# Patient Record
Sex: FEMALE | Race: WHITE | NOT HISPANIC OR LATINO | Employment: FULL TIME | URBAN - NONMETROPOLITAN AREA
[De-identification: names, ages, dates, MRNs, and addresses within clinical notes are randomized per-mention and may not be internally consistent; named-entity substitution may affect disease eponyms.]

---

## 2017-06-01 ENCOUNTER — TRANSCRIBE ORDERS (OUTPATIENT)
Dept: NUTRITION | Facility: HOSPITAL | Age: 42
End: 2017-06-01

## 2017-07-10 ENCOUNTER — HOSPITAL ENCOUNTER (OUTPATIENT)
Dept: NUTRITION | Facility: HOSPITAL | Age: 42
Discharge: HOME | End: 2017-07-10
Admitting: FAMILY MEDICINE

## 2017-07-10 VITALS — WEIGHT: 293 LBS | BODY MASS INDEX: 45.99 KG/M2 | HEIGHT: 67 IN

## 2017-07-10 PROCEDURE — 97802 MEDICAL NUTRITION INDIV IN: CPT

## 2017-07-10 NOTE — CONSULTS
Adult Outpatient Nutrition  Assessment/PES    Patient Name:  Tamara Stout  YOB: 1975  MRN: [id number]    Assessment Date:  7/10/2017          General Info       07/10/17 1606    Today's Session    Person(s) attending today's session Patient     Services Used Today? No    General Information    How well do you speak English? very well    Do you speak a language other than English at home? no    Are you able to read and write English? Yes    Lives With child(frandy), dependent   one daughter    Last grade of school completed Some college    Is patient pregnant? no            Physical Findings       07/10/17 1607    Physical Findings/Assessment    Additional Documentation Physical Appearance (Group)    Physical Appearance    Overall Physical Appearance obese              Nutritional Info/Activity       07/10/17 1608    Nutritional Information    Have you had weight changes? Yes    Describe weight changes Fluctuating    What is your desired body weight? 90.7 kg (200 lb)   180-200 lbs    Have you tried to lose weight before? Yes    List programs tried, date, and success Diet, Excercise- successful    What is your motivation to lose weight? Health, want to be around for my daughter and avoid diabetes    Food Allergies/Intolerances --   no    Use of Supplements minerals   B12    History of eating disorder? No    What cultural diet influences are important for you to follow? None-I don't like seafood/raw    Do you have difficulty chewing food? No    Who Prepares Meals self    List any food cravings/trigger foods you have Sugar, salt, home cooked, fruit, cucumbers    List any food aversions Seafood    How often during the day do you find yourself snacking? 2 times    Food Behaviors Stress eater;Boredom eater    How often do you eat out and where? Fast food- daily buger/soda, [employer] on Sunday salad or steak    Do you use Food Assistance programs (WIC, food stamps, food bank)? no    Do  you need information about Food Assistance programs? no    How many times do you drink milk per day? 1    How many times do you eat fruit per day? 1    How many times do you eat vegetables per day? 2    How many times do you drink juice per day? 0    How many times do you eat candy/chocolates per day? 2    How many times do you eat baked goods per day? 0    How many times do you eat desserts per day? 1    How many times do you eat ice cream per day? 0    How many times do you eat snack foods per day? 2    How many diet sodas do you drink per day? 0    How many regular sodas do you drink per day? 2    How many times do you eat ethnic food per day? 0    How many times do you have caffeine per day? 2    How many servings of artificial sweetner do you have per day? 0    How many meals do you eat each day? 3    How many snacks do you eat each day? 2    What is the biggest challenge you have with your diet? Poor choices   Fixing for one person- waste food, healthy snacks, time of eating    What type of support do you currently use to help you with your health issues? Family/friends/coworkers    Enter everything you can remember eating in the last 24 hours (1 day) Zinger, 1/2 cup milk, 3 chicken fingers, fries, 1 piece of bread from [employer], Ice Cream [employer], [employer] and fries, small coke    Eating Environment    Eating environment Alone;Work;Family    Physical Activity    Are you currently involved in an activity/exercise program?  No    Reasons for Inactivity Other (comment)   Used to walk but calcium deposites and planter fasciitis limit the time I can stand for long peridos    How would you rank exercise as an important health lifestyle practice? 9            Home Nutrition Report       07/10/17 1615    Home Nutrition Report    Diet No specific            Estimated/Assessed Needs       07/10/17 1615    Calculation Measurements    Weight Used For Calculations 62.3 kg (137 lb 4.1 oz)   IBW    Height Used for  "Calculations 1.702 m (5' 7\")    Estimated/Assessed Energy Needs    Energy Need Method [place]- Mee    Age 42    RMR ([facility] Mee Equation) 1305.22    Activity Factors ([facility])  Seated work, little or not strenuous leisure activity  1.6-1.7    Estimated Kcal Range  ~4023-7002 kcal    Estimated/Assessed Protein Needs    Weight Used for Protein Calculation 62.3 kg (137 lb 4.1 oz)    Protein (gm/kg) 0.8    0.8 Gm Protein (gm) 49.81    Estimated Protein Range ~50-62 gm    Estimated/Assessed Fluid Needs    Fluid Need Method RDA method    RDA Method (mL)  2400              Labs/Tests/Procedures/Meds       07/10/17 1617    Labs/Tests/Procedures/Meds    Labs/Tests Review Reviewed   High: Tot cholesterol, LDL, non-HDL cholesterol, HgbA1C    Medication Review Reviewed, pertinent   Folic Acid, B12, Lovoxyl            Problem/Interventions:        Problem 1       07/10/17 1620    Nutrition Diagnoses Problem 1    Problem 1 Overweight/Obesity    Etiology (related to) Factors Affecting Nutrition    Food Habit/Preferences Large Meals   Minimal breakfast    Best Intake of Fast Food;Soft Drinks;Desserts;Sweet Snacks;Salty Snacks    Signs/Symptoms (evidenced by) BMI    BMI Greater than 40                    Intervention Goal       07/10/17 1621    Intervention Goal    General Meet nutritional needs for age/condition;Provide information regarding MNT for treatment/condition    PO Meet estimated needs    Weight Appropriate weight loss              Comments:  Pt was seen today for abnormal weight gain. Pt currently weights 300 lbs and is 5'7\". Her goal weight is 180-200 lbs. Pt was very pleasant and has a strong desire to be healthier for her family and to avoid diabetes like her parents and grandfather. Pt's RMR was 2039 ~2050 kcals x 1.3 activity factor = 2665~ 2650 kcal needs. In order to lose 1 lb/week, pt would need to consume ~5450-8469 kcals/day. Pt was most concerned with adopting a healthier lifestyle than " focusing on her weight. RD assured her that the weight come if diet becomes healthier. Pt was concerned with her elevated cholesterol levels so RD reviewed those with pt and nutritional therapy handout for lowering.    Pt has visited the clinic in the past for weight loss in the past and was successful but has relapsed in her lifestyle changes and is back for help. Pt's biggest liz is eating breakfast in the morning. We discussed the barriers that prevent her from eating breakfast. Pt will work on getting to bed earlier so she can rise earlier in the morning and prepare a breakfast. We reviewed four breakfast variations for pt to try. Pt was excited to try these new recipes. RD also discussed the value in prepping meals ahead of time. Pt has tried this in the past but fell out of schedule. Pt will try to meal prep her breakfast and dinner. RD reviewed with pt how to count carbohydrates and allotted pt with 3 servings/meal and 1 serving/snack.     Pt was also insistent upon exercising but stated her plantar fasciitis has been hurting and she is unable to stand for long peridos of time. Pt has been seeing a podiatrist and surgery may be needed. Pt stated that she can wrap her foot and walk. RD encouraged pt to start with a small time frame and gradually increase time as foot improves.      Pt was provided with the following education materials: Counting Carbohydrates for People with Diabetes, Diabetic friendly snack ideas, What you Need to Know about Cholesterol, ADA Fat Variety, and Lowering Cholesterol handouts.     Pt made 4 goals this week: 1. Eating a healthy breakfast from the recipes provided every day for six days a week. Goal 2: Exercise/Walk 30 minutes-1 hour during lunch and after work 3-4 days a week. Goal 3: Weekly prepping of meals on a weekend night or as needed. Goal 4: Keep a food journal of new foods tried to review during next follow-up.     Pt scheduled a follow-up appointment for August 23 @  3:00 but this may be rescheduled for an earlier date if RD is available. RD to follow up with pt via phone call in two weeks.     I sincerely would like to thank you for this consult. I have enjoyed working with this patient and look forward to following up with her in the future.      Electronically signed by:  Char Mesa RD  07/10/17 4:24 PM

## 2017-08-11 ENCOUNTER — APPOINTMENT (OUTPATIENT)
Dept: NUTRITION | Facility: HOSPITAL | Age: 42
End: 2017-08-11

## 2017-08-11 ENCOUNTER — HOSPITAL ENCOUNTER (OUTPATIENT)
Dept: NUTRITION | Facility: HOSPITAL | Age: 42
Discharge: HOME | End: 2017-08-11
Admitting: FAMILY MEDICINE

## 2017-08-11 VITALS — HEIGHT: 67 IN | BODY MASS INDEX: 45.99 KG/M2 | WEIGHT: 293 LBS

## 2017-08-11 PROCEDURE — 97803 MED NUTRITION INDIV SUBSEQ: CPT

## 2017-08-11 NOTE — PROGRESS NOTES
"Adult Outpatient Nutrition  Assessment/PES    Patient Name:  Tamara Stout  YOB: 1975  MRN: [id number]    Assessment Date:  8/11/2017          General Info       08/11/17 1417    Today's Session    Person(s) attending today's session Patient     Services Used Today? No    General Information    How well do you speak English? very well    Do you speak a language other than English at home? no    Are you able to read and write English? Yes    Lives With child(frandy), dependent   daughter    Last grade of school completed Some college    Is patient pregnant? no                    Estimated/Assessed Needs       08/11/17 1419    Calculation Measurements    Weight Used For Calculations 62.3 kg (137 lb 4.1 oz)   IBW    Height Used for Calculations 1.702 m (5' 7\")    Estimated/Assessed Energy Needs    Energy Need Method [place]-St Jeor    Age 42    RMR ([place]-St[place] Jeor Equation) 1305.22    Activity Factors ([place] St Jeor)  Seated work, little or not strenuous leisure activity  1.6-1.7    Estimated Kcal Range  ~2458-5028 kcal    Estimated/Assessed Protein Needs    Weight Used for Protein Calculation 62.3 kg (137 lb 4.1 oz)    Protein (gm/kg) 0.8    0.8 Gm Protein (gm) 49.81    Estimated Protein Range ~50-62 gm                Problem/Interventions:        Problem 1       08/11/17 1423    Nutrition Diagnoses Problem 1    Problem 1 Overweight/Obesity    Etiology (related to) Factors Affecting Nutrition    Food Habit/Preferences Large Meals    Best Intake of Soft Drinks    Signs/Symptoms (evidenced by) BMI    BMI Greater than 40                    Intervention Goal       08/11/17 1425    Intervention Goal    General Meet nutritional needs for age/condition    PO Meet estimated needs    Weight Appropriate weight loss              Comments:  Pt was seen today as follow-up for abnormal weight gain. Pt was proud of progress on initial visit goals. Goal 1 she hit 90% of the time which was eating a " healthy breakfast. She also stated she is drinking more water and keeps track of it on her planner. She does still drink soda, but less. She will try adding fruit to water. Goal 2 she only met 60% of the time because she stated it is hard to make time to walk. Goal 3 she met 90% of the time which was meal prepping. Pt stated her eating habits improve when she meal preps.  Her friend at work has held her accountable as well. Goal 4 she has met 100% which was keeping a food journal.     This follow-up session, pt wanted to focus on healthy dinner and snack options. RD reviewed USDA's calorie amount for food groups with pt based on her allotted 2000 kcal diet/day. We made 5 healthy dinner options that pt can make throughout the week along with 10 + healthy snack combinations.     Pt was pleased with follow-up session and scheduled another follow-up for September 15th at 2:30 pm. Pt would like to have an email follow-up or check-up before next in-person follow-up. RD to follow pt. Consult ANIL HORVATHN.    Electronically signed by:  Char Mesa RD  08/11/17 2:29 PM

## 2017-08-23 ENCOUNTER — APPOINTMENT (OUTPATIENT)
Dept: NUTRITION | Facility: HOSPITAL | Age: 42
End: 2017-08-23

## 2017-09-15 ENCOUNTER — APPOINTMENT (OUTPATIENT)
Dept: NUTRITION | Facility: HOSPITAL | Age: 42
End: 2017-09-15

## 2017-10-11 ENCOUNTER — TRANSCRIBE ORDERS (OUTPATIENT)
Dept: ADMINISTRATIVE | Facility: HOSPITAL | Age: 42
End: 2017-10-11

## 2017-10-11 ENCOUNTER — HOSPITAL ENCOUNTER (OUTPATIENT)
Dept: ULTRASOUND IMAGING | Facility: HOSPITAL | Age: 42
Discharge: HOME | End: 2017-10-11
Admitting: NURSE PRACTITIONER

## 2017-10-11 DIAGNOSIS — S89.91XA BLUNT TRAUMA OF RIGHT LOWER LEG, INITIAL ENCOUNTER: Primary | ICD-10-CM

## 2017-10-11 PROCEDURE — 93971 EXTREMITY STUDY: CPT

## 2017-11-29 ENCOUNTER — DOCUMENTATION (OUTPATIENT)
Dept: NUTRITION | Facility: HOSPITAL | Age: 42
End: 2017-11-29

## 2017-11-29 NOTE — PROGRESS NOTES
Nutrition Services    Patient Name:  Tamara Stout  YOB: 1975  MRN: [id number]  Admit Date:  (Not on file)    Pt was mailed a follow-up letter on 10/11/17 and was asked to fill out form and send back to RD. Pt has not contacted RD or mailed back letter. Chart will be closed at this time. Thank you.    Electronically signed by:  Char Mesa RD  11/29/17 2:26 PM

## 2023-04-26 ENCOUNTER — TELEPHONE (OUTPATIENT)
Dept: OBSTETRICS AND GYNECOLOGY | Facility: CLINIC | Age: 48
End: 2023-04-26

## 2023-04-26 NOTE — TELEPHONE ENCOUNTER
Provider: DR HCOW     Caller: FRANKLIN SUE  Relationship to Patient: SELF  Phone Number: [phone]  Reason for Call: PATIENT WAS ABLE TO SEE HER PCP IN REGARDS TO THE BLEEDING AND NOT BEING ABLE TO GET A SOONER APPOINTMENT//SHE SHE DR MOJGAN CRESPO//PROVIDER GAVE HER PROVERA TO START 4/27/23//ALSO ORDERED AN US AND WANTS IT TO BE DONE BEFORE HER 5/18 DR CHOW APPOINTMENT//PATIENT IS STILL BLEEDING FROM STARTED HER CYCLE 3/28 AND SPOTTING THE WEEK PRIOR TO THAT//THE BLEEDING IS HEAVY//PATIENT STATES THAT SHE THINKS HER IRON IS LOW FROM LOOSING SO MUCH BLOOD BECAUSE SHE IS STARTING TO FEEL WEAKER AND TIRED//PLEASE FOLLOW UP WITH PATIENT IF THERE IS ANY ISSUES OR CONCERNS

## 2023-05-18 ENCOUNTER — OFFICE VISIT (OUTPATIENT)
Dept: OBSTETRICS AND GYNECOLOGY | Facility: CLINIC | Age: 48
End: 2023-05-18
Payer: COMMERCIAL

## 2023-05-18 ENCOUNTER — PREP FOR SURGERY (OUTPATIENT)
Dept: OTHER | Facility: HOSPITAL | Age: 48
End: 2023-05-18
Payer: COMMERCIAL

## 2023-05-18 VITALS
SYSTOLIC BLOOD PRESSURE: 134 MMHG | DIASTOLIC BLOOD PRESSURE: 76 MMHG | BODY MASS INDEX: 44.41 KG/M2 | WEIGHT: 293 LBS | HEIGHT: 68 IN

## 2023-05-18 DIAGNOSIS — R93.5 ABNORMAL ULTRASOUND OF ENDOMETRIUM: ICD-10-CM

## 2023-05-18 DIAGNOSIS — N92.1 MENORRHAGIA WITH IRREGULAR CYCLE: Primary | ICD-10-CM

## 2023-05-18 DIAGNOSIS — N93.9 ABNORMAL UTERINE BLEEDING (AUB): Primary | ICD-10-CM

## 2023-05-18 DIAGNOSIS — N95.1 MENOPAUSAL SYMPTOMS: ICD-10-CM

## 2023-05-18 PROCEDURE — 99204 OFFICE O/P NEW MOD 45 MIN: CPT | Performed by: OBSTETRICS & GYNECOLOGY

## 2023-05-18 RX ORDER — LEVOTHYROXINE SODIUM 137 UG/1
TABLET ORAL
COMMUNITY

## 2023-05-18 RX ORDER — POTASSIUM CHLORIDE 750 MG/1
CAPSULE, EXTENDED RELEASE ORAL
COMMUNITY

## 2023-05-18 RX ORDER — LEFLUNOMIDE 10 MG/1
TABLET ORAL EVERY 24 HOURS
COMMUNITY

## 2023-05-18 RX ORDER — SODIUM CHLORIDE 0.9 % (FLUSH) 0.9 %
3 SYRINGE (ML) INJECTION EVERY 12 HOURS SCHEDULED
OUTPATIENT
Start: 2023-05-18

## 2023-05-18 RX ORDER — SODIUM CHLORIDE 9 MG/ML
40 INJECTION, SOLUTION INTRAVENOUS AS NEEDED
OUTPATIENT
Start: 2023-05-18

## 2023-05-18 RX ORDER — VALACYCLOVIR HYDROCHLORIDE 1 G/1
1 TABLET, FILM COATED ORAL EVERY 12 HOURS SCHEDULED
COMMUNITY
Start: 2023-03-13

## 2023-05-18 RX ORDER — FOLIC ACID 1 MG/1
TABLET ORAL
COMMUNITY

## 2023-05-18 RX ORDER — SODIUM CHLORIDE 0.9 % (FLUSH) 0.9 %
10 SYRINGE (ML) INJECTION AS NEEDED
OUTPATIENT
Start: 2023-05-18

## 2023-05-18 RX ORDER — ZINC GLUCONATE 50 MG
50 TABLET ORAL DAILY
COMMUNITY

## 2023-05-18 RX ORDER — SEMAGLUTIDE 1.34 MG/ML
INJECTION, SOLUTION SUBCUTANEOUS
COMMUNITY

## 2023-05-18 RX ORDER — FLUTICASONE PROPIONATE 50 MCG
1 SPRAY, SUSPENSION (ML) NASAL DAILY
COMMUNITY
Start: 2023-02-22

## 2023-05-18 RX ORDER — FUROSEMIDE 40 MG/1
TABLET ORAL
COMMUNITY

## 2023-05-18 RX ORDER — BUPROPION HYDROCHLORIDE 150 MG/1
1 TABLET ORAL DAILY
COMMUNITY
Start: 2023-02-22

## 2023-05-18 NOTE — PROGRESS NOTES
Chief Complaint  Vaginal Bleeding (Patient complains of irregular vaginal bleeding, patient advised bleeding lasting x 2 months very heavy, painful and large clots. )     History of Present Illness:  Patient is 48 y.o.  who presents to [facility] here as a new patient for evaluation of abnormal uterine bleeding.  Patient reports prior to December having regular menstrual cycles monthly.  She reports skipping a menstrual cycle in December.  She had a menstrual cycle which was like normal in January.  She also skipped February.  She reports having the onset of bleeding starting on .  This lasted until May 11.  She reports it was extremely heavy passing large clots changing a pad every 2 hours.  She was also having severe cramping.  She did see her primary care provider Dr. Saenz.  She did have labs on .  Her FSH was 11.6.  She was started on Provera 5 mg daily for 5 days at that time.  She reports her bleeding stopped spontaneously on the .  She did have a transvaginal ultrasound.  She has not had any further bleeding since that time.  She does continue to cramp and feels like she is going to start her menstrual cycle.  She has been having some menopausal symptoms but not severe in nature.  She reports her mother went through menopause without any difficulty.  Her last Pap smear was 2 years ago and was normal.    History  Past Medical History:   Diagnosis Date   • Anemia    • Anxiety    • Fibroid    • Migraine     Since grade school   • Myocardial infarction    • PMS (premenstrual syndrome)    • Urinary tract infection 2023   • Varicella 1979     Current Outpatient Medications on File Prior to Visit   Medication Sig Dispense Refill   • buPROPion XL (WELLBUTRIN XL) 150 MG 24 hr tablet Take 1 tablet by mouth Daily.     • Cholecalciferol 50 MCG (2000) capsule Take  by mouth.     • ciclopirox (PENLAC) 8 % solution      • cyanocobalamin (VITAMIN B-12) 1000  "MCG tablet Take 1 tablet by mouth Daily.     • fluticasone (FLONASE) 50 MCG/ACT nasal spray 1 spray by Each Nare route Daily.     • folic acid (FOLVITE) 1 MG tablet folic acid 1 mg tablet   TAKE 1 TABLET BY MOUTH ONCE DAILY     • furosemide (LASIX) 40 MG tablet furosemide 40 mg tablet   TAKE 1 TABLET BY MOUTH ONCE DAILY     • leflunomide (ARAVA) 10 MG tablet Daily.     • levothyroxine (SYNTHROID, LEVOTHROID) 137 MCG tablet Synthroid 137 mcg tablet   TAKE 1 TABLET BY MOUTH ONCE DAILY     • potassium chloride (MICRO-K) 10 MEQ CR capsule potassium chloride ER 10 mEq capsule,extended release   TAKE 1 CAPSULE BY MOUTH ONCE DAILY     • Semaglutide,0.25 or 0.5MG/DOS, (Ozempic, 0.25 or 0.5 MG/DOSE,) 2 MG/1.5ML solution pen-injector Ozempic 0.25 mg or 0.5 mg (2 mg/1.5 mL) subcutaneous pen injector   Inject 0.25mg once weekly for 4 weeks, then increase to 0.5mg once weekly thereafter     • valACYclovir (VALTREX) 1000 MG tablet Take 1 tablet by mouth Every 12 (Twelve) Hours.     • zinc gluconate 50 MG tablet Take 1 tablet by mouth Daily.       No current facility-administered medications on file prior to visit.     Allergies   Allergen Reactions   • Clarithromycin Swelling   • Penicillins Hives     Past Surgical History:   Procedure Laterality Date   • NO PAST SURGERIES       Family History   Problem Relation Age of Onset   • Diabetes Mother    • Diabetes Father    • Hypertension Father    • Breast cancer Maternal Grandmother      Social History     Socioeconomic History   • Marital status:    Tobacco Use   • Smoking status: Never     Passive exposure: Never   Substance and Sexual Activity   • Alcohol use: Never   • Drug use: Never   • Sexual activity: Not Currently     Partners: Male     Birth control/protection: Condom, Abstinence       Physical Examination:  Vital Signs: /76   Ht 172.7 cm (68\")   Wt (!) 147 kg (325 lb)   BMI 49.42 kg/m²     General Appearance: alert, appears stated age, and " cooperative  Breasts: Not performed.  Abdomen: no masses, no hepatomegaly, no splenomegaly, soft non-tender, no guarding, and no rebound tenderness  Pelvic: Not performed.    Data Review:  The following data was reviewed by: Angelina Rossi MD on 05/18/2023:     Labs:  Labs from April 25, 2023 reviewed.  Iron level 42.  UA positive for blood.  TSH 0.249.  Lipid panel normal.  CMP normal other than glucose 110.  CBC normal with hemoglobin of 13.4.  Hemoglobin A1c 6.9.  Progesterone level 0.5.  Estradiol level 117.  FSH 11.6.  LH 14.2.    Imaging:  US transvaginal non-ob (05/12/2023 09:54)    Medical Records:  None    Assessment and Plan   1. Abnormal uterine bleeding (AUB)  The patient was informed that menstrual flow outside of normal volume, duration, regularity, or frequency is considered abnormal uterine bleeding.  The patient was informed that the normal duration of menstrual flow is usually 5 days and the normal cycle typically lasts between 21-35 days.  The patient was informed that heavy menstrual bleeding has been defined as blood loss greater than 80 mL and given that this is hard to quantify excessive blood loss is based on the patient's perception.  The patient was informed that AUB in women aged 40 years to menopause may be due to anovulatory bleeding in response to declining ovarian function.  The patient was informed that it may be due to endometrial hyperplasia, carcinoma, endometrial atrophy, and fibroids.  It is recommended that she have a pregnancy test, CBC, and TSH.  It is recommended that her pap smear be up to date and that she consider testing for Chlamydia if she feels she is at high risk.  It is recommended that she have a transvaginal ultrasound for further evaluation.  It is recommended that in women who are older than 45 years of age have endometrial sampling.  The various options for treatment of AUB were discussed pending the above evaluation.  Medical options for management of AUB  include nonsteroidal antiinflammatory drugs, progestins, combination oral contraceptives, a levonorgestrel intrauterine device, or tranexamic acid.  If there are structural abnormalities noted on imaging then surgery may be indicated.  Endometrial ablation may be an option to control bleeding in women who have completed childbearing.  After discussion of the various options for further evaluation and management of her symptoms patient desires to proceed with D&C and diagnostic hysteroscopy to evaluate her endometrium.  I have discussed with the patient the risk, complications, benefits, as well as other alternatives.  Orders have been placed as noted.  Plan pending symptoms.    2. Menopausal symptoms  The various options for the management of menopausal symptoms was discussed.  The medical treatment options discussed include HRT, SSRIs, SSNRIs, clonidine, and gabapentin.  The risks and benefits were discussed including the findings from the WHI study.  The increased risk of breast cancer, CAD, stroke, and VTE events were discussed for combination therapy vs the increased risk of CV events and breast cancer not being seen in the estrogen only group.  The lowest effective dose for the shortest duration of treatment was discussed in regards to HRT.  Other alternatives including otc supplements and lifestyle changes were also discussed.  Local estrogen therapy to relieve atrophic vaginal symptoms was discussed was well as other alternatives.    3. Abnormal ultrasound of endometrium  Patient with previous transvaginal ultrasound as noted.  I have discussed with the patient the need for evaluation of her endometrium with either endometrial biopsy or D&C.  I discussed with the patient the risk versus benefits of each.  She desires to proceed with D&C and diagnostic hysteroscopy as noted.  Orders have been placed.  Patient is to schedule as discussed.    Follow Up/Instructions:  Follow up as noted.  Patient was given  instructions and counseling regarding her condition or for health maintenance advice. Please see specific information pulled into the AVS if appropriate.     Note: Speech recognition transcription software may have been used to dictate portions of this document.  An attempt at proofreading has been made though minor errors in transcription may still be present.    This note was electronically signed.  Angelina Rossi M.D.

## 2023-05-24 ENCOUNTER — PRE-ADMISSION TESTING (OUTPATIENT)
Dept: PREADMISSION TESTING | Facility: HOSPITAL | Age: 48
End: 2023-05-24
Payer: COMMERCIAL

## 2023-05-24 VITALS — BODY MASS INDEX: 44.41 KG/M2 | HEIGHT: 68 IN | WEIGHT: 293 LBS

## 2023-05-24 DIAGNOSIS — N92.1 MENORRHAGIA WITH IRREGULAR CYCLE: ICD-10-CM

## 2023-05-24 LAB
B-HCG UR QL: NEGATIVE
BASOPHILS # BLD AUTO: 0.08 10*3/MM3 (ref 0–0.2)
BASOPHILS NFR BLD AUTO: 1.2 % (ref 0–1.5)
BILIRUB UR QL STRIP: NEGATIVE
CLARITY UR: ABNORMAL
COLOR UR: YELLOW
DEPRECATED RDW RBC AUTO: 47.6 FL (ref 37–54)
EOSINOPHIL # BLD AUTO: 0.43 10*3/MM3 (ref 0–0.4)
EOSINOPHIL NFR BLD AUTO: 6.6 % (ref 0.3–6.2)
ERYTHROCYTE [DISTWIDTH] IN BLOOD BY AUTOMATED COUNT: 15.2 % (ref 12.3–15.4)
GLUCOSE UR STRIP-MCNC: NEGATIVE MG/DL
HCT VFR BLD AUTO: 40.3 % (ref 34–46.6)
HGB BLD-MCNC: 12.4 G/DL (ref 12–15.9)
HGB UR QL STRIP.AUTO: NEGATIVE
IMM GRANULOCYTES # BLD AUTO: 0.01 10*3/MM3 (ref 0–0.05)
IMM GRANULOCYTES NFR BLD AUTO: 0.2 % (ref 0–0.5)
KETONES UR QL STRIP: ABNORMAL
LEUKOCYTE ESTERASE UR QL STRIP.AUTO: NEGATIVE
LYMPHOCYTES # BLD AUTO: 1.55 10*3/MM3 (ref 0.7–3.1)
LYMPHOCYTES NFR BLD AUTO: 23.8 % (ref 19.6–45.3)
MCH RBC QN AUTO: 26.3 PG (ref 26.6–33)
MCHC RBC AUTO-ENTMCNC: 30.8 G/DL (ref 31.5–35.7)
MCV RBC AUTO: 85.6 FL (ref 79–97)
MONOCYTES # BLD AUTO: 0.57 10*3/MM3 (ref 0.1–0.9)
MONOCYTES NFR BLD AUTO: 8.7 % (ref 5–12)
NEUTROPHILS NFR BLD AUTO: 3.88 10*3/MM3 (ref 1.7–7)
NEUTROPHILS NFR BLD AUTO: 59.5 % (ref 42.7–76)
NITRITE UR QL STRIP: NEGATIVE
NRBC BLD AUTO-RTO: 0 /100 WBC (ref 0–0.2)
PH UR STRIP.AUTO: 5.5 [PH] (ref 5–8)
PLATELET # BLD AUTO: 202 10*3/MM3 (ref 140–450)
PMV BLD AUTO: 11.1 FL (ref 6–12)
PROT UR QL STRIP: ABNORMAL
RBC # BLD AUTO: 4.71 10*6/MM3 (ref 3.77–5.28)
SP GR UR STRIP: 1.03 (ref 1–1.03)
UROBILINOGEN UR QL STRIP: ABNORMAL
WBC NRBC COR # BLD: 6.52 10*3/MM3 (ref 3.4–10.8)

## 2023-05-24 PROCEDURE — S0260 H&P FOR SURGERY: HCPCS | Performed by: OBSTETRICS & GYNECOLOGY

## 2023-05-24 PROCEDURE — 85025 COMPLETE CBC W/AUTO DIFF WBC: CPT

## 2023-05-24 PROCEDURE — 36415 COLL VENOUS BLD VENIPUNCTURE: CPT

## 2023-05-24 PROCEDURE — 81025 URINE PREGNANCY TEST: CPT

## 2023-05-24 PROCEDURE — 81003 URINALYSIS AUTO W/O SCOPE: CPT

## 2023-05-24 NOTE — H&P
MYRIAM Nieves  Tamara Stout  : 1975  MRN: [id number]  CSN: [id number]    History and Physical  Subjective   Tamara Stout is a 48 y.o. year old  who presents for surgery due to menorrhagia with irregular cycles.  Patient was seen in the office.  She has had regular menstrual cycles until December.  She started skipping her menstrual cycles.  She had the onset of bleeding on  which lasted until May 11.  Her FSH was in the normal range.  She had a transvaginal ultrasound as well.  The myometrium is heterogeneous suggesting small fibroids or adenomyosis.  Her endometrial stripe measured 7 mm and was heterogeneous.  She is here for further evaluation with D&C and diagnostic hysteroscopy.    History  Past Medical History:   Diagnosis Date   • Anemia    • Anxiety    • Fibroid    • Migraine     Since grade school   • Myocardial infarction    • PMS (premenstrual syndrome)    • Urinary tract infection 2023   • Varicella      No current facility-administered medications on file prior to encounter.     Current Outpatient Medications on File Prior to Encounter   Medication Sig Dispense Refill   • buPROPion XL (WELLBUTRIN XL) 150 MG 24 hr tablet Take 1 tablet by mouth Daily.     • Cholecalciferol 50 MCG (2000 UT) capsule Take  by mouth.     • ciclopirox (PENLAC) 8 % solution      • cyanocobalamin (VITAMIN B-12) 1000 MCG tablet Take 1 tablet by mouth Daily.     • fluticasone (FLONASE) 50 MCG/ACT nasal spray 1 spray by Each Nare route Daily.     • folic acid (FOLVITE) 1 MG tablet folic acid 1 mg tablet   TAKE 1 TABLET BY MOUTH ONCE DAILY     • furosemide (LASIX) 40 MG tablet furosemide 40 mg tablet   TAKE 1 TABLET BY MOUTH ONCE DAILY     • leflunomide (ARAVA) 10 MG tablet Daily.     • levothyroxine (SYNTHROID, LEVOTHROID) 137 MCG tablet Synthroid 137 mcg tablet   TAKE 1 TABLET BY MOUTH ONCE DAILY     • potassium chloride (MICRO-K) 10 MEQ CR capsule potassium chloride ER 10 mEq  capsule,extended release   TAKE 1 CAPSULE BY MOUTH ONCE DAILY     • Semaglutide,0.25 or 0.5MG/DOS, (Ozempic, 0.25 or 0.5 MG/DOSE,) 2 MG/1.5ML solution pen-injector Ozempic 0.25 mg or 0.5 mg (2 mg/1.5 mL) subcutaneous pen injector   Inject 0.25mg once weekly for 4 weeks, then increase to 0.5mg once weekly thereafter     • valACYclovir (VALTREX) 1000 MG tablet Take 1 tablet by mouth Every 12 (Twelve) Hours.     • zinc gluconate 50 MG tablet Take 1 tablet by mouth Daily.       Allergies   Allergen Reactions   • Clarithromycin Swelling   • Penicillins Hives     Past Surgical History:   Procedure Laterality Date   • NO PAST SURGERIES       Family History   Problem Relation Age of Onset   • Diabetes Mother    • Diabetes Father    • Hypertension Father    • Breast cancer Maternal Grandmother      Social History     Socioeconomic History   • Marital status:    Tobacco Use   • Smoking status: Never     Passive exposure: Never   Substance and Sexual Activity   • Alcohol use: Never   • Drug use: Never   • Sexual activity: Not Currently     Partners: Male     Birth control/protection: Condom, Abstinence     Review of Systems  The following systems were reviewed and negative:  constitution, eyes, ENT, respiratory, cardiovascular, gastrointestinal, genitourinary, integument, breast, hematologic / lymphatic, musculoskeletal, neurological, behavioral/psych, endocrine and allergies / immunologic    Objective  Recent Vitals       7/10/2017 8/11/2017 5/18/2023       BP: -- -- 134/76     Weight: 136 kg (300 lb) 137 kg (302 lb 6.4 oz) 147 kg (325 lb)     BMI (Calculated): 47 47.4 49.4         Physical Exam:  General Appearance:  Alert and cooperative, not in any acute distress.   Head:  Atraumatic and normocephalic, without obvious abnormality.   Eyes:          PERRLA, conjunctivae and sclerae normal, no Icterus. No pallor. Extraocular movements are within normal limits.   Ears:  Ears appear intact with no abnormalities noted.    Throat: No oral lesions, no thrush, oral mucosa moist.   Neck: Supple, trachea midline, no thyromegaly, no carotid bruit.   Back:   No kyphoscoliosis present. No tenderness to palpation,   range of motion normal.  No CVAT.   Lungs:   Chest shape is normal. Breath sounds heard bilaterally equally.  No crackles or wheezing. No Pleural rub or bronchial breathing. Normal respiratory effort.   Heart:  Normal S1 and S2, no murmur, no gallop, no rub. No JVD.   Abdomen:   Normal bowel sounds, no masses, no hepatomegaly, no splenomegaly. Soft, non-tender, no guarding, no rebound tenderness.   Extremities: Moves all extremities well, no edema, no cyanosis, no clubbing.  Normal range of motion. Normal strength and tone.   Skin: No bleeding, bruising or rash. No palpable masses noted or induration.   Psychiatric: Alert and oriented  to time, place, and person. Appropriate mood and affect. Thought content organized and appropriate judgment.       Recent Labs  Lab Results (last 7 days)     ** No results found for the last 168 hours. **           Recent Imaging  No radiology results for the last 10 days        Assessment   1. Menorrhagia with irregular cycles  2. Abnormal ultrasound of endometrium     Plan   1. D&C with diagnostic hysteroscopy.  2. ABx and DVT prophylaxis as indicated.  3. Risks, complications, benefits, and other alternatives discussed.  4. All questions answered and pt in agreement with plan.    Angelina Rossi M.D.  5/24/2023  06:57 EDT

## 2023-05-25 ENCOUNTER — HOSPITAL ENCOUNTER (OUTPATIENT)
Facility: HOSPITAL | Age: 48
Setting detail: HOSPITAL OUTPATIENT SURGERY
Discharge: HOME | End: 2023-05-25
Attending: OBSTETRICS & GYNECOLOGY | Admitting: OBSTETRICS & GYNECOLOGY
Payer: COMMERCIAL

## 2023-05-25 ENCOUNTER — ANESTHESIA (OUTPATIENT)
Dept: PERIOP | Facility: HOSPITAL | Age: 48
End: 2023-05-25
Payer: COMMERCIAL

## 2023-05-25 ENCOUNTER — ANESTHESIA EVENT (OUTPATIENT)
Dept: PERIOP | Facility: HOSPITAL | Age: 48
End: 2023-05-25
Payer: COMMERCIAL

## 2023-05-25 VITALS
RESPIRATION RATE: 18 BRPM | OXYGEN SATURATION: 92 % | HEART RATE: 75 BPM | TEMPERATURE: 97.4 F | DIASTOLIC BLOOD PRESSURE: 75 MMHG | SYSTOLIC BLOOD PRESSURE: 140 MMHG

## 2023-05-25 DIAGNOSIS — N92.1 MENORRHAGIA WITH IRREGULAR CYCLE: ICD-10-CM

## 2023-05-25 LAB
B-HCG UR QL: NEGATIVE
EXPIRATION DATE: NORMAL
INTERNAL NEGATIVE CONTROL: NORMAL
INTERNAL POSITIVE CONTROL: NORMAL
Lab: NORMAL

## 2023-05-25 PROCEDURE — 81025 URINE PREGNANCY TEST: CPT | Performed by: OBSTETRICS & GYNECOLOGY

## 2023-05-25 PROCEDURE — 25010000002 MIDAZOLAM PER 1MG: Performed by: NURSE ANESTHETIST, CERTIFIED REGISTERED

## 2023-05-25 PROCEDURE — 25010000002 HYDROMORPHONE PER 4 MG: Performed by: NURSE ANESTHETIST, CERTIFIED REGISTERED

## 2023-05-25 PROCEDURE — 58558 HYSTEROSCOPY BIOPSY: CPT | Performed by: OBSTETRICS & GYNECOLOGY

## 2023-05-25 PROCEDURE — 25010000002 PROPOFOL 10 MG/ML EMULSION: Performed by: NURSE ANESTHETIST, CERTIFIED REGISTERED

## 2023-05-25 PROCEDURE — 25010000002 KETOROLAC TROMETHAMINE PER 15 MG: Performed by: NURSE ANESTHETIST, CERTIFIED REGISTERED

## 2023-05-25 PROCEDURE — 0 LIDOCAINE 1 % SOLUTION: Performed by: OBSTETRICS & GYNECOLOGY

## 2023-05-25 PROCEDURE — 25010000002 ONDANSETRON PER 1 MG: Performed by: NURSE ANESTHETIST, CERTIFIED REGISTERED

## 2023-05-25 PROCEDURE — 25010000002 DEXAMETHASONE PER 1 MG: Performed by: NURSE ANESTHETIST, CERTIFIED REGISTERED

## 2023-05-25 RX ORDER — ONDANSETRON 2 MG/ML
INJECTION INTRAMUSCULAR; INTRAVENOUS AS NEEDED
Status: DISCONTINUED | OUTPATIENT
Start: 2023-05-25 | End: 2023-05-25 | Stop reason: SURG

## 2023-05-25 RX ORDER — ACETAMINOPHEN 500 MG
1000 TABLET ORAL ONCE
Status: DISCONTINUED | OUTPATIENT
Start: 2023-05-25 | End: 2023-05-25 | Stop reason: HOSPADM

## 2023-05-25 RX ORDER — KETAMINE HCL IN NACL, ISO-OSM 100MG/10ML
SYRINGE (ML) INJECTION AS NEEDED
Status: DISCONTINUED | OUTPATIENT
Start: 2023-05-25 | End: 2023-05-25 | Stop reason: SURG

## 2023-05-25 RX ORDER — SODIUM CHLORIDE 9 MG/ML
40 INJECTION, SOLUTION INTRAVENOUS AS NEEDED
Status: DISCONTINUED | OUTPATIENT
Start: 2023-05-25 | End: 2023-05-25 | Stop reason: HOSPADM

## 2023-05-25 RX ORDER — LIDOCAINE HYDROCHLORIDE 10 MG/ML
INJECTION, SOLUTION INFILTRATION; PERINEURAL AS NEEDED
Status: DISCONTINUED | OUTPATIENT
Start: 2023-05-25 | End: 2023-05-25 | Stop reason: HOSPADM

## 2023-05-25 RX ORDER — LIDOCAINE HYDROCHLORIDE 20 MG/ML
INJECTION, SOLUTION INTRAVENOUS AS NEEDED
Status: DISCONTINUED | OUTPATIENT
Start: 2023-05-25 | End: 2023-05-25 | Stop reason: SURG

## 2023-05-25 RX ORDER — ONDANSETRON HYDROCHLORIDE 8 MG/1
8 TABLET, FILM COATED ORAL EVERY 8 HOURS PRN
Qty: 15 TABLET | Refills: 0 | Status: SHIPPED | OUTPATIENT
Start: 2023-05-25

## 2023-05-25 RX ORDER — MIDAZOLAM HYDROCHLORIDE 2 MG/2ML
INJECTION, SOLUTION INTRAMUSCULAR; INTRAVENOUS AS NEEDED
Status: DISCONTINUED | OUTPATIENT
Start: 2023-05-25 | End: 2023-05-25 | Stop reason: SURG

## 2023-05-25 RX ORDER — ACETAMINOPHEN 500 MG
1000 TABLET ORAL EVERY 8 HOURS PRN
Qty: 60 TABLET | Refills: 0 | Status: SHIPPED | OUTPATIENT
Start: 2023-05-25

## 2023-05-25 RX ORDER — CERTOLIZUMAB PEGOL 400 MG
KIT SUBCUTANEOUS
COMMUNITY

## 2023-05-25 RX ORDER — SODIUM CHLORIDE, SODIUM LACTATE, POTASSIUM CHLORIDE, CALCIUM CHLORIDE 600; 310; 30; 20 MG/100ML; MG/100ML; MG/100ML; MG/100ML
1000 INJECTION, SOLUTION INTRAVENOUS CONTINUOUS
Status: DISCONTINUED | OUTPATIENT
Start: 2023-05-25 | End: 2023-05-25 | Stop reason: HOSPADM

## 2023-05-25 RX ORDER — ONDANSETRON 2 MG/ML
4 INJECTION INTRAMUSCULAR; INTRAVENOUS ONCE AS NEEDED
Status: DISCONTINUED | OUTPATIENT
Start: 2023-05-25 | End: 2023-05-25 | Stop reason: HOSPADM

## 2023-05-25 RX ORDER — HYDROCODONE BITARTRATE AND ACETAMINOPHEN 5; 325 MG/1; MG/1
1 TABLET ORAL ONCE AS NEEDED
Status: DISCONTINUED | OUTPATIENT
Start: 2023-05-25 | End: 2023-05-25 | Stop reason: HOSPADM

## 2023-05-25 RX ORDER — PROMETHAZINE HYDROCHLORIDE 25 MG/1
12.5 TABLET ORAL ONCE AS NEEDED
Status: DISCONTINUED | OUTPATIENT
Start: 2023-05-25 | End: 2023-05-25 | Stop reason: HOSPADM

## 2023-05-25 RX ORDER — SODIUM CHLORIDE 0.9 % (FLUSH) 0.9 %
10 SYRINGE (ML) INJECTION AS NEEDED
Status: DISCONTINUED | OUTPATIENT
Start: 2023-05-25 | End: 2023-05-25 | Stop reason: HOSPADM

## 2023-05-25 RX ORDER — ONDANSETRON 4 MG/1
4 TABLET, FILM COATED ORAL ONCE AS NEEDED
Status: DISCONTINUED | OUTPATIENT
Start: 2023-05-25 | End: 2023-05-25 | Stop reason: HOSPADM

## 2023-05-25 RX ORDER — IBUPROFEN 800 MG/1
800 TABLET ORAL EVERY 8 HOURS PRN
Qty: 30 TABLET | Refills: 0 | Status: SHIPPED | OUTPATIENT
Start: 2023-05-25

## 2023-05-25 RX ORDER — KETOROLAC TROMETHAMINE 30 MG/ML
INJECTION, SOLUTION INTRAMUSCULAR; INTRAVENOUS AS NEEDED
Status: DISCONTINUED | OUTPATIENT
Start: 2023-05-25 | End: 2023-05-25 | Stop reason: SURG

## 2023-05-25 RX ORDER — SODIUM CHLORIDE 0.9 % (FLUSH) 0.9 %
3 SYRINGE (ML) INJECTION EVERY 12 HOURS SCHEDULED
Status: DISCONTINUED | OUTPATIENT
Start: 2023-05-25 | End: 2023-05-25 | Stop reason: HOSPADM

## 2023-05-25 RX ORDER — HYDROMORPHONE HCL 110MG/55ML
PATIENT CONTROLLED ANALGESIA SYRINGE INTRAVENOUS AS NEEDED
Status: DISCONTINUED | OUTPATIENT
Start: 2023-05-25 | End: 2023-05-25 | Stop reason: SURG

## 2023-05-25 RX ORDER — DEXAMETHASONE SODIUM PHOSPHATE 4 MG/ML
INJECTION, SOLUTION INTRA-ARTICULAR; INTRALESIONAL; INTRAMUSCULAR; INTRAVENOUS; SOFT TISSUE AS NEEDED
Status: DISCONTINUED | OUTPATIENT
Start: 2023-05-25 | End: 2023-05-25 | Stop reason: SURG

## 2023-05-25 RX ORDER — PROPOFOL 10 MG/ML
VIAL (ML) INTRAVENOUS AS NEEDED
Status: DISCONTINUED | OUTPATIENT
Start: 2023-05-25 | End: 2023-05-25 | Stop reason: SURG

## 2023-05-25 RX ADMIN — HYDROMORPHONE HYDROCHLORIDE 0.5 MG: 2 INJECTION, SOLUTION INTRAMUSCULAR; INTRAVENOUS; SUBCUTANEOUS at 11:42

## 2023-05-25 RX ADMIN — PROPOFOL 100 MG: 10 INJECTION, EMULSION INTRAVENOUS at 11:22

## 2023-05-25 RX ADMIN — LIDOCAINE HYDROCHLORIDE 60 MG: 20 INJECTION, SOLUTION INTRAVENOUS at 11:22

## 2023-05-25 RX ADMIN — KETOROLAC TROMETHAMINE 15 MG: 30 INJECTION, SOLUTION INTRAMUSCULAR at 11:24

## 2023-05-25 RX ADMIN — GLYCOPYRROLATE 0.2 MCG: 0.2 INJECTION, SOLUTION INTRAMUSCULAR; INTRAVITREAL at 11:18

## 2023-05-25 RX ADMIN — Medication 10 MG: at 11:22

## 2023-05-25 RX ADMIN — Medication 10 MG: at 11:18

## 2023-05-25 RX ADMIN — DEXAMETHASONE SODIUM PHOSPHATE 4 MG: 4 INJECTION, SOLUTION INTRAMUSCULAR; INTRAVENOUS at 11:24

## 2023-05-25 RX ADMIN — MIDAZOLAM HYDROCHLORIDE 2 MG: 1 INJECTION, SOLUTION INTRAMUSCULAR; INTRAVENOUS at 11:18

## 2023-05-25 RX ADMIN — SODIUM CHLORIDE, POTASSIUM CHLORIDE, SODIUM LACTATE AND CALCIUM CHLORIDE 1000 ML: 600; 310; 30; 20 INJECTION, SOLUTION INTRAVENOUS at 10:25

## 2023-05-25 RX ADMIN — ONDANSETRON 4 MG: 2 INJECTION INTRAMUSCULAR; INTRAVENOUS at 11:24

## 2023-05-25 RX ADMIN — PROPOFOL 100 MCG/KG/MIN: 10 INJECTION, EMULSION INTRAVENOUS at 11:22

## 2023-05-25 NOTE — DISCHARGE INSTRUCTIONS
No pushing, pulling, tugging,  heavy lifting, or strenuous activity.  No major decision making, driving, or drinking alcoholic beverages for 24 hours. ( due to the medications you have  received)  Always use good hand hygiene/washing techniques.  NO driving while taking pain medications.    * if you have an incision:  Check your incision area every day for signs of infection.   Check for:  * more redness, swelling, or pain  *more fluid or blood  *warmth  *pus or bad smell To assist you in voiding:  Drink plenty of fluids  Listen to running water while attempting to void.    If you are unable to urinate and you have an uncomfortable urge to void or it has been   6 hours since you were discharged, return to the Emergency Room   Pelvic rest is best described as not putting anything in your vagina. This includes tampons, douching, tub bathing or sexual activity.

## 2023-05-25 NOTE — ANESTHESIA PREPROCEDURE EVALUATION
Anesthesia Evaluation     Patient summary reviewed and Nursing notes reviewed   no history of anesthetic complications:  NPO Solid Status: > 8 hours  NPO Liquid Status: > 8 hours           Airway   Mallampati: I  TM distance: >3 FB  Neck ROM: full  no difficulty expected  Dental - normal exam     Pulmonary - negative pulmonary ROS and normal exam   Cardiovascular - negative cardio ROS and normal exam        Neuro/Psych  (+) headaches, psychiatric history Anxiety,    GI/Hepatic/Renal/Endo    (+)   diabetes mellitus type 2, thyroid problem hypothyroidism    Musculoskeletal (-) negative ROS    Abdominal    Substance History - negative use     OB/GYN negative ob/gyn ROS         Other - negative ROS                     Anesthesia Plan    ASA 2     MAC     intravenous induction     Anesthetic plan, risks, benefits, and alternatives have been provided, discussed and informed consent has been obtained with: patient.        CODE STATUS:    Level Of Support Discussed With: Patient  Code Status (Patient has no pulse and is not breathing): CPR (Attempt to Resuscitate)  Medical Interventions (Patient has pulse or is breathing): Full Support  Release to patient: Routine Release

## 2023-05-25 NOTE — OP NOTE
Ezequiel Stout  : 1975  MRN: [id number]  CSN: [id number]  Date: 2023    Operative Report    Pre-op Diagnosis:  Menorrhagia with irregular cycle [N92.1]  Abnormal endometrium on ultrasound     Post-op Diagnosis:  Post-Op Diagnosis Codes:     * Menorrhagia with irregular cycle [N92.1]        Same   Procedure: Procedure(s):  DILATATION AND CURETTAGE HYSTEROSCOPY     Surgeon: Angelina Rossi M.D.     Assist: Staff was responsible for performing the following activities: Retraction and Suction and their skilled assistance was necessary for the success of this case.     OR Staff: Circulator: Rupa Stout RN; Tram Neville RN  Scrub Person: Molly Samaniego PCT     Anesthesia: Choice     Estimated Blood Loss: 5 mls     Specimens:  Endometrial     Findings: Grossly normal appearing endometrium     Complications: none       Description of Procedure:  After the appropriate time out and adequate dosing of her anesthesia, the patient had been prepped and draped in the usual sterile fashion.  She was placed in the dorsal lithotomy position using Gabriel stirrups.  The bladder had been drained with a red rubber catheter per nursing.  A weighted speculum was placed in the vagina.  The anterior lip of the cervix was grasped with a single-tooth tenaculum.  The cervix was injected at the 3 o'clock and 9 o'clock position with 1% lidocaine plain without any complications.  The cervix was then progressively dilated using Justice dilators.  Rigid hysteroscopy was then performed with the above findings noted.  Sharp curettings were then obtained with a good cry throughout with tissue retrieved and sent for pathologic specimen.  The cervical tenaculum was removed and the cervix was noted to be hemostatic after the application of 2-0 chromic suture in a figure-of-eight fashion.  All instrument and sponge counts were correct at the end of the procedure.  The patient tolerated the procedure well.  There were no  complications.  She was taken to the postoperative recovery room in stable condition.    Angelina Rossi M.D.  5/25/2023  11:40 EDT

## 2023-05-25 NOTE — ANESTHESIA POSTPROCEDURE EVALUATION
Patient: Tamara Stout    Procedure Summary     Date: 05/25/23 Room / Location: [facility] OR 2 /  [facility]    Anesthesia Start: 1115 Anesthesia Stop: 1146    Procedure: DILATATION AND CURETTAGE HYSTEROSCOPY (Uterus) Diagnosis:       Menorrhagia with irregular cycle      (Menorrhagia with irregular cycle [N92.1])    Surgeons: Angelina Rossi MD Provider: Frida Ramirez CRNA    Anesthesia Type: MAC ASA Status: 2          Anesthesia Type: MAC    Vitals  Vitals Value Taken Time   /78 05/25/23 1148   Temp 97.4 °F (36.3 °C) 05/25/23 1148   Pulse 83 05/25/23 1148   Resp 16 05/25/23 1148   SpO2 94 % 05/25/23 1148           Post Anesthesia Care and Evaluation    Patient location during evaluation: PHASE II  Patient participation: complete - patient participated  Level of consciousness: awake and alert  Pain score: 0  Pain management: satisfactory to patient    Airway patency: patent  Anesthetic complications: No anesthetic complications  PONV Status: none  Cardiovascular status: acceptable and stable  Respiratory status: acceptable  Hydration status: acceptable    Comments: Vitals signs as noted in nursing documentation as per protocol.

## 2023-05-26 LAB — REF LAB TEST METHOD: NORMAL

## 2023-06-08 ENCOUNTER — OFFICE VISIT (OUTPATIENT)
Dept: OBSTETRICS AND GYNECOLOGY | Facility: CLINIC | Age: 48
End: 2023-06-08
Payer: COMMERCIAL

## 2023-06-08 VITALS
SYSTOLIC BLOOD PRESSURE: 128 MMHG | WEIGHT: 293 LBS | HEIGHT: 68 IN | BODY MASS INDEX: 44.41 KG/M2 | DIASTOLIC BLOOD PRESSURE: 78 MMHG

## 2023-06-08 DIAGNOSIS — Z09 POSTOPERATIVE FOLLOW-UP: Primary | ICD-10-CM

## 2023-06-08 DIAGNOSIS — Z98.890 S/P D&C (STATUS POST DILATION AND CURETTAGE): ICD-10-CM

## 2023-06-08 NOTE — PROGRESS NOTES
Subjective   Chief Complaint   Patient presents with    Post-op     2 weeks post op D&C hysteroscopy, no complaints.       Tamara Stout is a 48 y.o. year old  presenting to be seen for post op visit.  Doing well post procedure  No vaginal bleeding presently  Normal bowel and bladder function  Pathology benign    Past Medical History:   Diagnosis Date    ADD (attention deficit disorder)     Anemia     Anxiety     Borderline diabetes     Disease of thyroid gland     Fibroid     Migraine     Since grade school    PMS (premenstrual syndrome)     Urinary tract infection 2023    Varicella 1979        Current Outpatient Medications:     acetaminophen (TYLENOL) 500 MG tablet, Take 2 tablets by mouth Every 8 (Eight) Hours As Needed for Mild Pain or Moderate Pain., Disp: 60 tablet, Rfl: 0    buPROPion XL (WELLBUTRIN XL) 150 MG 24 hr tablet, Take 1 tablet by mouth Daily., Disp: , Rfl:     Certolizumab Pegol (Cimzia) 2 X 200 MG kit, Inject  under the skin into the appropriate area as directed., Disp: , Rfl:     Cholecalciferol 50 MCG (2000 UT) capsule, Take 1 capsule by mouth Daily., Disp: , Rfl:     ciclopirox (PENLAC) 8 % solution, Apply 1 application topically to the appropriate area as directed Every Night., Disp: , Rfl:     cyanocobalamin (VITAMIN B-12) 1000 MCG tablet, Take 1 tablet by mouth Daily., Disp: , Rfl:     Elderberry 500 MG capsule, Take 1 capsule by mouth Every Other Day., Disp: , Rfl:     fluticasone (FLONASE) 50 MCG/ACT nasal spray, 1 spray by Each Nare route Daily As Needed., Disp: , Rfl:     folic acid (FOLVITE) 1 MG tablet, folic acid 1 mg tablet  TAKE 1 TABLET BY MOUTH ONCE DAILY, Disp: , Rfl:     furosemide (LASIX) 40 MG tablet, furosemide 40 mg tablet  TAKE 1 TABLET BY MOUTH ONCE DAILY, Disp: , Rfl:     ibuprofen (ADVIL,MOTRIN) 800 MG tablet, Take 1 tablet by mouth Every 8 (Eight) Hours As Needed for Mild Pain., Disp: 30 tablet, Rfl: 0    leflunomide (ARAVA) 10 MG tablet, Take 2 tablets  "by mouth Daily., Disp: , Rfl:     levothyroxine (SYNTHROID, LEVOTHROID) 137 MCG tablet, Synthroid 137 mcg tablet  TAKE 1 TABLET BY MOUTH ONCE DAILY, Disp: , Rfl:     ondansetron (ZOFRAN) 8 MG tablet, Take 1 tablet by mouth Every 8 (Eight) Hours As Needed for Nausea or Vomiting., Disp: 15 tablet, Rfl: 0    potassium chloride (MICRO-K) 10 MEQ CR capsule, potassium chloride ER 10 mEq capsule,extended release  TAKE 1 CAPSULE BY MOUTH ONCE DAILY, Disp: , Rfl:     zinc gluconate 50 MG tablet, Take 1 tablet by mouth Every Other Day., Disp: , Rfl:    Allergies   Allergen Reactions    Clarithromycin Swelling    Penicillins Hives      Past Surgical History:   Procedure Laterality Date    COLONOSCOPY      D & C HYSTEROSCOPY N/A 5/25/2023    Procedure: DILATATION AND CURETTAGE HYSTEROSCOPY;  Surgeon: Angelina Rossi MD;  Location: [facility];  Service: Obstetrics/Gynecology;  Laterality: N/A;      Social History     Socioeconomic History    Marital status:    Tobacco Use    Smoking status: Never     Passive exposure: Never   Vaping Use    Vaping Use: Never used   Substance and Sexual Activity    Alcohol use: Yes     Comment: rarely    Drug use: Never    Sexual activity: Not Currently     Partners: Male     Birth control/protection: Condom, Abstinence      Family History   Problem Relation Age of Onset    Diabetes Mother     Diabetes Father     Hypertension Father     Breast cancer Maternal Grandmother        Review of Systems   Constitutional:  Negative for chills, diaphoresis and fever.   Gastrointestinal: Negative.    Genitourinary:  Negative for difficulty urinating, dysuria, pelvic pain and vaginal bleeding.         Objective   /78   Ht 172.7 cm (68\")   Wt (!) 150 kg (330 lb)   LMP 05/11/2023 Comment: POC negative 05-  BMI 50.18 kg/m²     Physical Exam  Constitutional:       Appearance: Normal appearance. She is well-developed and well-groomed.   Eyes:      General: Lids are normal.      Extraocular " Movements: Extraocular movements intact.      Conjunctiva/sclera: Conjunctivae normal.   Neurological:      General: No focal deficit present.      Mental Status: She is alert and oriented to person, place, and time.   Psychiatric:         Attention and Perception: Attention normal.         Mood and Affect: Mood normal.         Speech: Speech normal.         Behavior: Behavior is cooperative.          Result Review :                   Assessment and Plan  Diagnoses and all orders for this visit:    1. Postoperative follow-up (Primary)    2. S/P D&C (status post dilation and curettage)      Patient Instructions   Follow up 3 months for pap/annual           This note was electronically signed.    Winnie Hannah PA-C   June 8, 2023

## 2023-07-28 ENCOUNTER — TRANSCRIBE ORDERS (OUTPATIENT)
Dept: ADMINISTRATIVE | Facility: HOSPITAL | Age: 48
End: 2023-07-28
Payer: COMMERCIAL

## 2023-07-28 DIAGNOSIS — E11.9 DIABETES MELLITUS WITHOUT COMPLICATION: Primary | ICD-10-CM

## 2023-10-04 ENCOUNTER — HOSPITAL ENCOUNTER (OUTPATIENT)
Dept: NUTRITION | Facility: HOSPITAL | Age: 48
Discharge: HOME | End: 2023-10-04
Admitting: FAMILY MEDICINE
Payer: COMMERCIAL

## 2023-10-04 ENCOUNTER — TRANSCRIBE ORDERS (OUTPATIENT)
Dept: ADMINISTRATIVE | Facility: HOSPITAL | Age: 48
End: 2023-10-04
Payer: COMMERCIAL

## 2023-10-04 DIAGNOSIS — E11.9 DIABETES MELLITUS WITHOUT COMPLICATION: Primary | ICD-10-CM

## 2023-10-04 PROCEDURE — 97802 MEDICAL NUTRITION INDIV IN: CPT

## 2023-10-04 NOTE — PROGRESS NOTES
Adult Outpatient Nutrition  Assessment/PES    Patient Name:  Tamara Stout  YOB: 1975  MRN: [id number]    Assessment Date:  10/4/2023    Comments:   Tamara was referred to outpatient nutrition for recent diabetes diagnosis. Eats three meals per day on average. Take-out 5-6 days per week. She reports that she lives alone and does so out of convenience. Has been recently trying to increase water intake, does drink 2-3 cans of Coke per day. RD explained the relation between protein and BG, encouraged pt to pair carbs with protein, prioritize protein intake (goal of 135 grams per day). Discussed high-protein foods, tips for increasing intake. Discussed carbohydrate counting, 30-60 grams per meal and 15 grams for snacks. Discussed strictly limiting Coke to 2 per day, increasing water intake. Discussed utilizing tumbler cup, setting an alarm to increase consumption. All questions answered. Provided pt with multiple recipes, resources. Pt with RD contact information, F/U scheduled for 11/14 @ 2:30 p.m.                   Electronically signed by:  Day Luna RD  10/04/23 13:40 EDT   Assessment: 








HOSPITAL NEUROLOGY CONSULT


REQUESTING: Kelly Cushing, DO


REASON: stroke





HPI:


66 year old right-handed woman with a history of HTN, smoking and depression 

presented in admission [facility] due to [facility]MS.  Records reviewed in 

[facility].


Patient was admitted to [facility] on 6/18 after experiencing left-sided weakness and 

headache.  She was found to have a large 60ml right frontoparietal IPH, which 

required surgical evacuation.  She was monitored in the ICU with BP control.  

MRI brain wow was done showing no underlying mass/tumor or vascular 

malformation and no evidence of amyloid.  She was transferred to in rehab on 7 /5.  She had not made much progress in rehab.  She had left spastic hemiplegia 

for which Baclofen was uptitrated.  Seemingly after the uptitration of Baclofen 

the patient developed AMS, becoming more confused and not participating in 

therapy.  Patient also noted chest pain near the left shoulder.  She was 

transferred to [facility] for further eval.  Repeat CT head wo showed evolution of her 

IPH without any new findings.  MRI brain wo was attempted but limited due to 

patient motion.  Her Baclofen was held, which resulted in some improvement in 

her mental status.  She is now back on a low dose at 5mg TID (down from 15mg TID

).  Neurology consulted mainly to give family some guidance on what to expect 

regarding recovery and prognosis after her stroke.








ROS:


As per the HPI, otherwise a complete 12 point ROS was performed and is negative





ALLERGIES AND MEDS:


As recorded in the EMR - reviewed and reconciled





PFSH:


As per the intake H&P by Dr. Cleary from 7/24.  Rehab notes and [facility] notes also 

reviewed.  





EXAM:


VS reviewed in EMR


GEN: WDWN laying in NAD


HEENT: NCAT, sclera anicteric, conjunctiva not injected, MMM, oropharynx clear, 

no scalp tenderness


NECK: supple, nontender, no meningismus


CV: RRR s1 s2 wo m/r/c/g.  Carotid pulses 2+ wo bruit


NEURO:


MS: awake, alert, oriented to all spheres.  Poor attention. Speech 

nondysarthric.  No language disturbance.  Follows commands.  Not attending well 

to left side.  Recent/remote memory grossly intact.  Mood euthymic.  Good fund 

of knowledge.


CN: pupils 3mm round and reactive.  Unable to visualize fundi.  VFF.  Left-

sided visual neglect present.  Primary gaze centered.  Full ocular motility.  

Facial sensation preserved.  Face symmetric.   Hearing grossly intact to finger 

rub.  Palatoglossal movements intact.  Shoulder shrug and head turn strong.


MOTOR: normal bulk.  Increased tone in the LUE - arm is adducted and has some 

flexion about the elbow and wrist.  Also with some increased extensor tone in 

the LLE and adduction in the LLE.  No movement in the LUE/LLE, even proximally.

  Right side has normal tone and full power.


SENSORY: Some mildly reduced sensation in the LUE, otherwise intact elsewhere.  

Has extinction of the left side on double simultaneous stim.


COORD: no ataxia FN/HS.  Kae preserved.  


REFLEX: plantars upgoing on left, down on right.  No clonus.  DTRS 2/4 on right

, 3/4 on left.


GAIT: unable to safely test











DATA REVIEW:


Labs reviewed in EMR








PERSONALLY INTERPRETED RESULTS AND DATA:


CT head and MRI brain per HPI





IMPRESSION AND RECOMMENDATIONS:





// HEMORRHAGIC STROKE - LIKELY HTN


// LEFT SPASTIC HEMIPLEGIA


// LEFT-SIDED VISUOSPATIAL NEGLECT


// LUE SENSORY LOSS





Patient with a large right frontoparietal IPH, likely HTN in origin, as workup 

for underlying cause was negative.


She has rather profound deficits as noted above.


Given she is about 1.5 months out with early development of significant 

spasticity and no movements whatsoever in the left arm/leg, I reviewed with the 

family and patient that she is unlikely to regain much meaningful function in 

those limbs.  Further, given the size of the stroke, I am not optimistic she 

will recover from her left-sided neglect.


I reviewed my exam findings, underlying pathology of her IPH, timeline for 

stroke recovery (generally plateauing around 3-6 months after initial event), 

and her current prognosis as noted above.  Time will tell if she regains any 

further function, but as noted above, I am not too optimistic.


Regarding secondary prevention, we reviewed that BP control is of the utmost 

importance.  Certainly smoking cessation will help her overall health in 

addition to cerebrovascular health.  She is appropriately on DVT 

chemoprophylaxis in hospital, but would not start antiplatelet going forward.  


Recommend not increasing Baclofen.  Would follow up outpatient for 

consideration of Botox injections for her spasticity.


Discussed with patient, family and Dr. Cushing.


Sign off.








Objective: 





 Vital Signs











Temp Pulse Resp BP Pulse Ox


 


 36.8 C   70   18   107/61   95 


 


 07/27/18 11:45  07/27/18 11:45  07/27/18 11:45  07/27/18 11:45  07/27/18 11:45








 











 07/26/18 07/27/18 07/28/18





 05:59 05:59 05:59


 


Intake Total 2500 1200 


 


Output Total  700 200


 


Balance 2500 500 -200











Allergies/Adverse Reactions: 


 





prednisone Allergy (Verified 07/24/18 19:43)

## 2023-10-27 ENCOUNTER — OFFICE VISIT (OUTPATIENT)
Dept: OBSTETRICS AND GYNECOLOGY | Facility: CLINIC | Age: 48
End: 2023-10-27
Payer: COMMERCIAL

## 2023-10-27 VITALS
SYSTOLIC BLOOD PRESSURE: 138 MMHG | BODY MASS INDEX: 45.99 KG/M2 | HEIGHT: 67 IN | WEIGHT: 293 LBS | DIASTOLIC BLOOD PRESSURE: 80 MMHG

## 2023-10-27 DIAGNOSIS — N89.8 VAGINAL DRYNESS: ICD-10-CM

## 2023-10-27 DIAGNOSIS — N92.6 IRREGULAR MENSES: ICD-10-CM

## 2023-10-27 DIAGNOSIS — Z01.411 ENCOUNTER FOR GYNECOLOGICAL EXAMINATION (GENERAL) (ROUTINE) WITH ABNORMAL FINDINGS: Primary | ICD-10-CM

## 2023-10-27 DIAGNOSIS — N95.1 MENOPAUSAL SYMPTOMS: ICD-10-CM

## 2023-10-27 DIAGNOSIS — Z12.31 ENCOUNTER FOR SCREENING MAMMOGRAM FOR BREAST CANCER: ICD-10-CM

## 2023-10-27 NOTE — PROGRESS NOTES
Chief Complaint  Gynecologic Exam     History of Present Illness:  Patient is 48 y.o.  who presents to [facility] here for annual examination.  Patient reports going with no menstrual cycle following her D&C until September.  She had her D&C at the end of May.  She reports she has been having hot flashes as well as night sweats.  She has also recently noted vaginal dryness as well as itching and irritation.  This has been intermittent in nature.  She did have her mammogram in August of this year.  She had a colonoscopy 2 years ago.  She sees Dr. Saenz for her primary care.  She did have previous labs with hormone levels as noted.    History  Past Medical History:   Diagnosis Date    ADD (attention deficit disorder)     Anemia     Anxiety     Borderline diabetes     Disease of thyroid gland     Fibroid     Migraine     Since grade school    Myocardial infarction     PMS (premenstrual syndrome)     Urinary tract infection 2023    Varicella 1979     Current Outpatient Medications on File Prior to Visit   Medication Sig Dispense Refill    acetaminophen (TYLENOL) 500 MG tablet Take 2 tablets by mouth Every 8 (Eight) Hours As Needed for Mild Pain or Moderate Pain. 60 tablet 0    Certolizumab Pegol (Cimzia) 2 X 200 MG kit Inject  under the skin into the appropriate area as directed.      Cholecalciferol 50 MCG (2000 UT) capsule Take 1 capsule by mouth Daily.      ciclopirox (PENLAC) 8 % solution Apply 1 application topically to the appropriate area as directed Every Night.      cyanocobalamin (VITAMIN B-12) 1000 MCG tablet Take 1 tablet by mouth Daily.      Elderberry 500 MG capsule Take 1 capsule by mouth Every Other Day.      fluticasone (FLONASE) 50 MCG/ACT nasal spray 1 spray by Each Nare route Daily As Needed.      folic acid (FOLVITE) 1 MG tablet folic acid 1 mg tablet   TAKE 1 TABLET BY MOUTH ONCE DAILY      ibuprofen (ADVIL,MOTRIN) 800 MG tablet Take 1 tablet by mouth Every  "8 (Eight) Hours As Needed for Mild Pain. 30 tablet 0    leflunomide (ARAVA) 10 MG tablet Take 2 tablets by mouth Daily.      levothyroxine (SYNTHROID, LEVOTHROID) 137 MCG tablet Synthroid 137 mcg tablet   TAKE 1 TABLET BY MOUTH ONCE DAILY      zinc gluconate 50 MG tablet Take 1 tablet by mouth Every Other Day.      [DISCONTINUED] buPROPion XL (WELLBUTRIN XL) 150 MG 24 hr tablet Take 1 tablet by mouth Daily.      [DISCONTINUED] furosemide (LASIX) 40 MG tablet furosemide 40 mg tablet   TAKE 1 TABLET BY MOUTH ONCE DAILY      [DISCONTINUED] ondansetron (ZOFRAN) 8 MG tablet Take 1 tablet by mouth Every 8 (Eight) Hours As Needed for Nausea or Vomiting. 15 tablet 0    [DISCONTINUED] potassium chloride (MICRO-K) 10 MEQ CR capsule potassium chloride ER 10 mEq capsule,extended release   TAKE 1 CAPSULE BY MOUTH ONCE DAILY       No current facility-administered medications on file prior to visit.     Allergies   Allergen Reactions    Clarithromycin Swelling    Penicillins Hives     Past Surgical History:   Procedure Laterality Date    COLONOSCOPY      D & C HYSTEROSCOPY N/A 5/25/2023    Procedure: DILATATION AND CURETTAGE HYSTEROSCOPY;  Surgeon: Angelina Rossi MD;  Location: [facility];  Service: Obstetrics/Gynecology;  Laterality: N/A;     Family History   Problem Relation Age of Onset    Diabetes Mother     Diabetes Father     Hypertension Father     Breast cancer Maternal Grandmother      Social History     Socioeconomic History    Marital status:    Tobacco Use    Smoking status: Never     Passive exposure: Never   Vaping Use    Vaping Use: Never used   Substance and Sexual Activity    Alcohol use: Yes     Comment: rarely    Drug use: Never    Sexual activity: Not Currently     Partners: Male     Birth control/protection: Condom, Abstinence       Physical Examination:  Vital Signs: /80   Ht 170.2 cm (67\")   Wt (!) 152 kg (335 lb)   BMI 52.47 kg/m²     General Appearance: alert, appears stated age, and " cooperative  Breasts: Examined in supine position  Symmetric without masses or skin dimpling  Nipples normal without inversion, lesions or discharge  There are no palpable axillary nodes  Abdomen: no masses, no hepatomegaly, no splenomegaly, soft non-tender, no guarding, and no rebound tenderness  Pelvic: Clinical staff was present for exam  External genitalia:  normal appearance of the external genitalia including [place]th[place]'s and [place]'s glands.  :  urethral meatus normal;  Vaginal: Mild atrophic changes noted  Cervix:  normal appearance.  Uterus:  normal size, shape and consistency.  Adnexa:  normal bimanual exam of the adnexa.  Pap smear done and specimen sent using Thin-Prep technique    Data Review:  The following data was reviewed by: Angelina Rossi MD on 10/27/2023:     Labs:  Labs reviewed from 4/25/2023  Hemoglobin A1c 6.9, UA unremarkable other than crystals, progesterone 0.5, estradiol 117, FSH 11.6, LH 14.2, cholesterol 161, LDL 97, triglycerides 116, CMP normal other than glucose 110, CBC normal with hemoglobin of 13.4.  Imaging:  MM digital mammo diagnostic with yasmeen bilateral (08/03/2023 15:50)   Medical Records:  None    Assessment and Plan   1. Encounter for screening mammogram for breast cancer  It is recommended per ACOG, for women at average risk to start annual mammogram screening at the age of 40 until the age of 75 and an individualized decision be made for women after age 75.  She was encouraged to continue getting yearly mammograms.  She should report any palpable breast lump(s) or skin changes regardless of mammographic findings.  I explained to Tamara that notification regarding her mammogram results will come from the center performing the study.  Our office will not be routinely calling with mammogram results.  It is her responsibility to make sure that the results from the mammogram are communicated to her by the breast center.  If she has any questions about the results, she is  welcome to call our office anytime.  The patient reports she has done her mammogram and results are noted.    Tamara was counseled regarding having clinical breast exams and breast self-awareness.  Women aged 29-39 years of age should have clinical breast exams every 1-3 years and yearly aged 40 and older.  The patient was counseled regarding breast self-awareness focusing on having a sense of what is normal for her breasts so that she can tell if there are changes.  Even small changes should be reported to provider.   - Mammo Screening Digital Tomosynthesis Bilateral With CAD; Future    2. Encounter for gynecological examination (general) (routine) with abnormal findings  Pap was done today.  If she does not receive the results of the Pap within 2 weeks  time, she was instructed to call to find out the results.  I explained to Tamara that the recommendations for Pap smear interval in a low risk patient has lengthened to 3 years time if cytology alone normal or  5 years time if both cytology and HPV testing were normal.  I encouraged her to be seen yearly for a full physical exam including breast and pelvic exam even during the off years when PAP's will not be performed.   - LIQUID-BASED PAP SMEAR WITH HPV GENOTYPING IF ASCUS ([facility])    3. Menopausal symptoms  The various options for the management of menopausal symptoms was discussed.  The medical treatment options discussed include HRT, SSRIs, SSNRIs, clonidine, and gabapentin.  The risks and benefits were discussed including the findings from the WHI study.  The increased risk of breast cancer, CAD, stroke, and VTE events were discussed for combination therapy vs the increased risk of CV events and breast cancer not being seen in the estrogen only group.  The lowest effective dose for the shortest duration of treatment was discussed in regards to HRT.  Other alternatives including otc supplements and lifestyle changes were also discussed.  Local estrogen  therapy to relieve atrophic vaginal symptoms was discussed was well as other alternatives.   Patient most likely is perimenopausal as discussed.  She is to call if worsening and/or changes in her symptoms as discussed.    4. Irregular menses  Patient with irregular menses as noted.  She is to call if worsening and/or changes in her symptoms.  She may need additional laboratory assessment if continued symptoms.    5. Vaginal dryness  Discussed various options for relief of atrophic vaginal symptoms related to menopause. Discussed local therapy for treatment of vaginal symptoms only.  Discussed the different formulation options including cream, ring, and tablets.  Discussed the low risk of systemic absorption in postmenopausal women with atrophy using 25 mcgs of estradiol on a daily basis.  Recommend low dose use 2-3x/wk for maintenance of treatment.  Other treatment options were discussed including the use of water-based and silicone-based vaginal lubricants and moisturizers.  Also discussed was the FDA approved treatment option of ospemifene for moderate to severe dyspareunia.     Follow Up/Instructions:  Follow up as noted.  Patient was given instructions and counseling regarding her condition or for health maintenance advice. Please see specific information pulled into the AVS if appropriate.     Note: Speech recognition transcription software may have been used to dictate portions of this document.  An attempt at proofreading has been made though minor errors in transcription may still be present.    This note was electronically signed.  Angelina Rosis M.D.

## 2023-10-31 LAB — REF LAB TEST METHOD: NORMAL

## 2023-11-14 ENCOUNTER — HOSPITAL ENCOUNTER (OUTPATIENT)
Dept: NUTRITION | Facility: HOSPITAL | Age: 48
Discharge: HOME | End: 2023-11-14
Payer: COMMERCIAL

## 2023-11-15 ENCOUNTER — TRANSCRIBE ORDERS (OUTPATIENT)
Dept: ADMINISTRATIVE | Facility: HOSPITAL | Age: 48
End: 2023-11-15
Payer: COMMERCIAL

## 2023-11-15 ENCOUNTER — HOSPITAL ENCOUNTER (OUTPATIENT)
Dept: NUTRITION | Facility: HOSPITAL | Age: 48
Discharge: HOME | End: 2023-11-15
Admitting: FAMILY MEDICINE
Payer: COMMERCIAL

## 2023-11-15 DIAGNOSIS — E11.9 DIABETES MELLITUS WITHOUT COMPLICATION: Primary | ICD-10-CM

## 2023-11-15 PROCEDURE — 97802 MEDICAL NUTRITION INDIV IN: CPT

## 2023-11-15 NOTE — PROGRESS NOTES
Adult Outpatient Nutrition  Assessment    Patient Name:  Tamara Stout  YOB: 1975  MRN: [id number]    Assessment Date:  11/15/2023    Comments:    RD spoke with pt via telephone for F/U on 11/14. She reports that she has implemented many of the changes discussed during our last session. She has increased water intake, limited Coke to 2 or less per day. She has been focusing more on protein intake and trying many of the recipes we discusses previously. She expresses concern with overeating at dinner, as she often skips lunch when she gets tied up with work and feels overly hungry at dinner. RD encouraged pt to set an alarm on her phone to remind her to break for lunch and to prioritize mindfulness during this time, pt agreeable. Concern also with portion control with snacks, specifically chips. RD encouraged pt to purchase mini bags to avoid overeating. Pt with questions regarding fiber intake and satiety. RD discussed fiber-rich foods, provided options for incorporating into the diet. Discussed meal prepping, pt reports she would like to implement this. She currently has not been participating in physical activity, though she would like to implement. She does have concern with her knees, RD discussed options including elliptical, resistance bands at home. Goal for every other day for 30 minutes. Pt agreeable to all interventions discussed. F/U scheduled for 12/12/23 @ 2:30 via Zoom.                     Electronically signed by:  Day Luna RD  11/15/23 14:29 EST

## 2024-03-07 ENCOUNTER — HOSPITAL ENCOUNTER (OUTPATIENT)
Dept: NUTRITION | Facility: HOSPITAL | Age: 49
Discharge: HOME | End: 2024-03-07
Payer: COMMERCIAL

## 2024-03-08 NOTE — PROGRESS NOTES
Adult Outpatient Nutrition  Assessment    Patient Name:  Tamara Stout  YOB: 1975  MRN: [id number]    Assessment Date:  3/8/2024    Comments:      Met w/ patient for initial nutrition assessment regarding diabetes. Pt states she is prediabetic and her A1C is somewhere between 6-6.4%. Pt reports lately she feels like she has a lot of inflammation happening throughout her body, and wants to make diet changes to feel healthier. Pt drinks approximately 40 ounces of water per day and one large Coke or Mountain Dew. For breakfast, she normally eats yogurt with strawberries and granola. Lunch is usually something quick and easy like McDonalds. Pt states she works from home and has a snack cart in her office, and states that is one of her biggest issues - snacking throughout the day.     Recommended patient decrease soda intake to once per week and drink mostly water. Advised patient to allot 300 calories of daily calorie goal to snacks. Recommended rotating snacks by choosing one healthy snack that is 300 calories, and then choosing a unhealthy snack that is 300 calories the next day. Recommended patient follow a 1800 calorie diet with 100-125g protein per day. Pt agreeable and notes goals are feasible. Pt w/ follow-up appointment on April 11th @ 11 am. RD available PRN.         Electronically signed by:  Sarah Roberts RD  03/08/24 07:31 EST

## 2024-03-22 ENCOUNTER — OFFICE VISIT (OUTPATIENT)
Dept: OBSTETRICS AND GYNECOLOGY | Facility: CLINIC | Age: 49
End: 2024-03-22
Payer: COMMERCIAL

## 2024-03-22 VITALS
SYSTOLIC BLOOD PRESSURE: 142 MMHG | BODY MASS INDEX: 45.99 KG/M2 | DIASTOLIC BLOOD PRESSURE: 88 MMHG | WEIGHT: 293 LBS | HEIGHT: 67 IN

## 2024-03-22 DIAGNOSIS — N93.9 ABNORMAL UTERINE BLEEDING (AUB): Primary | ICD-10-CM

## 2024-03-22 DIAGNOSIS — R53.83 FATIGUE, UNSPECIFIED TYPE: ICD-10-CM

## 2024-03-22 DIAGNOSIS — N95.1 MENOPAUSAL SYMPTOMS: ICD-10-CM

## 2024-03-22 PROCEDURE — 99214 OFFICE O/P EST MOD 30 MIN: CPT | Performed by: OBSTETRICS & GYNECOLOGY

## 2024-03-22 NOTE — PROGRESS NOTES
Chief Complaint  Follow-up (TVS done today for abnormal bleeding)     History of Present Illness:  Patient is 48 y.o.  who presents to [facility] here for evaluation of her abnormal uterine bleeding.  Patient had a previous D&C in May of last year.  Her pathology returned with proliferative endometrium and polypoid fragments.  Patient reports having normal menstrual cycles until December.  Patient skipped December and January.  She reports having the onset of bleeding on  and has had continuous bleeding since that time.  She reports it has been extremely heavy until today.  She has been changing a pad 4 times per day.  She has been fatigued.  She did have recent labs with her primary care provider.  She has been having menopausal symptoms as well.  She has not had any hormonal assessment.    History  Past Medical History:   Diagnosis Date    ADD (attention deficit disorder)     Anemia     Anxiety     Borderline diabetes     Disease of thyroid gland     Fibroid     Migraine     Since grade school    Myocardial infarction     PMS (premenstrual syndrome)     Urinary tract infection 2023    Varicella 1979     Current Outpatient Medications on File Prior to Visit   Medication Sig Dispense Refill    acetaminophen (TYLENOL) 500 MG tablet Take 2 tablets by mouth Every 8 (Eight) Hours As Needed for Mild Pain or Moderate Pain. 60 tablet 0    Certolizumab Pegol (Cimzia) 2 X 200 MG kit Inject  under the skin into the appropriate area as directed.      Cholecalciferol 50 MCG (2000 UT) capsule Take 1 capsule by mouth Daily.      ciclopirox (PENLAC) 8 % solution Apply 1 Application topically to the appropriate area as directed Every Night.      cyanocobalamin (VITAMIN B-12) 1000 MCG tablet Take 1 tablet by mouth Daily.      Elderberry 500 MG capsule Take 1 capsule by mouth Every Other Day.      fluticasone (FLONASE) 50 MCG/ACT nasal spray 1 spray by Each Nare route Daily As Needed.    "   folic acid (FOLVITE) 1 MG tablet folic acid 1 mg tablet   TAKE 1 TABLET BY MOUTH ONCE DAILY      ibuprofen (ADVIL,MOTRIN) 800 MG tablet Take 1 tablet by mouth Every 8 (Eight) Hours As Needed for Mild Pain. 30 tablet 0    leflunomide (ARAVA) 10 MG tablet Take 2 tablets by mouth Daily.      levothyroxine (SYNTHROID, LEVOTHROID) 137 MCG tablet Synthroid 137 mcg tablet   TAKE 1 TABLET BY MOUTH ONCE DAILY      zinc gluconate 50 MG tablet Take 1 tablet by mouth Every Other Day.       No current facility-administered medications on file prior to visit.     Allergies   Allergen Reactions    Clarithromycin Swelling    Penicillins Hives     Past Surgical History:   Procedure Laterality Date    COLONOSCOPY      D & C HYSTEROSCOPY N/A 5/25/2023    Procedure: DILATATION AND CURETTAGE HYSTEROSCOPY;  Surgeon: Angelina Rossi MD;  [facility];  Service: Obstetrics/Gynecology;  Laterality: N/A;     Family History   Problem Relation Age of Onset    Diabetes Mother     Diabetes Father     Hypertension Father     Breast cancer Maternal Grandmother      Social History     Socioeconomic History    Marital status:    Tobacco Use    Smoking status: Never     Passive exposure: Never   Vaping Use    Vaping status: Never Used   Substance and Sexual Activity    Alcohol use: Yes     Comment: rarely    Drug use: Never    Sexual activity: Not Currently     Partners: Male     Birth control/protection: Condom, Abstinence       Physical Examination:  Vital Signs: /88   Ht 170.2 cm (67\")   Wt (!) 153 kg (338 lb)   BMI 52.94 kg/m²     General Appearance: alert, appears stated age, and cooperative  Breasts: Not performed.  Abdomen: no masses, no hepatomegaly, no splenomegaly, soft non-tender, no guarding, and no rebound tenderness  Pelvic: Not performed.    Data Review:  The following data was reviewed by: Angelina Rossi MD on 03/22/2024:     Labs:  PROBNP (03/02/2024 09:34)  Thyroxine(SENDOUT) (03/02/2024 09:34)  TSH " (03/02/2024 09:34)  COMPREHENSIVE METABOLIC PANEL (03/02/2024 09:34)  CBC with automated diff (03/02/2024 09:34)  Imaging:  US Non-ob Transvaginal (03/22/2024 14:38)   Medical Records:  None    Assessment and Plan   1. Abnormal uterine bleeding (AUB)  Patient with abnormal uterine bleeding.  Patient did have a D&C 1 year ago as noted.  Labs today as discussed.  Patient reports the bleeding is currently tapering off.  Plan pending results.  - Follicle Stimulating Hormone  - Estradiol  - Testosterone, Free, Total  - Thyroid Panel With TSH  - CBC & Differential    2. Menopausal symptoms  The various options for the management of menopausal symptoms was discussed.  The medical treatment options discussed include HRT, SSRIs, SSNRIs, clonidine, and gabapentin.  The risks and benefits were discussed including the findings from the WHI study.  The increased risk of breast cancer, CAD, stroke, and VTE events were discussed for combination therapy vs the increased risk of CV events and breast cancer not being seen in the estrogen only group.  The lowest effective dose for the shortest duration of treatment was discussed in regards to HRT.  Other alternatives including otc supplements and lifestyle changes were also discussed.  Local estrogen therapy to relieve atrophic vaginal symptoms was discussed was well as other alternatives.   - Follicle Stimulating Hormone  - Estradiol  - Testosterone, Free, Total    3. Fatigue, unspecified type  Thyroid function panel and CBC today as noted.  Plan pending results.  - Thyroid Panel With TSH  - CBC & Differential    Follow Up/Instructions:  Follow up as noted.  Patient was given instructions and counseling regarding her condition or for health maintenance advice. Please see specific information pulled into the AVS if appropriate.     Note: Speech recognition transcription software may have been used to dictate portions of this document.  An attempt at proofreading has been made though  minor errors in transcription may still be present.    This note was electronically signed.  Angelina Rossi M.D.

## 2024-03-27 LAB
BASOPHILS # BLD AUTO: 0.1 X10E3/UL (ref 0–0.2)
BASOPHILS NFR BLD AUTO: 1 %
EOSINOPHIL # BLD AUTO: 0.4 X10E3/UL (ref 0–0.4)
EOSINOPHIL NFR BLD AUTO: 6 %
ERYTHROCYTE [DISTWIDTH] IN BLOOD BY AUTOMATED COUNT: 16.1 % (ref 11.7–15.4)
ESTRADIOL SERPL-MCNC: 172 PG/ML
FSH SERPL-ACNC: 14.1 MIU/ML
FT4I SERPL CALC-MCNC: 2.9 (ref 1.2–4.9)
HCT VFR BLD AUTO: 39.4 % (ref 34–46.6)
HGB BLD-MCNC: 12.1 G/DL (ref 11.1–15.9)
IMM GRANULOCYTES # BLD AUTO: 0 X10E3/UL (ref 0–0.1)
IMM GRANULOCYTES NFR BLD AUTO: 1 %
LYMPHOCYTES # BLD AUTO: 1.5 X10E3/UL (ref 0.7–3.1)
LYMPHOCYTES NFR BLD AUTO: 23 %
MCH RBC QN AUTO: 27.1 PG (ref 26.6–33)
MCHC RBC AUTO-ENTMCNC: 30.7 G/DL (ref 31.5–35.7)
MCV RBC AUTO: 88 FL (ref 79–97)
MONOCYTES # BLD AUTO: 0.3 X10E3/UL (ref 0.1–0.9)
MONOCYTES NFR BLD AUTO: 4 %
NEUTROPHILS # BLD AUTO: 4.2 X10E3/UL (ref 1.4–7)
NEUTROPHILS NFR BLD AUTO: 65 %
PLATELET # BLD AUTO: 218 X10E3/UL (ref 150–450)
RBC # BLD AUTO: 4.47 X10E6/UL (ref 3.77–5.28)
T3RU NFR SERPL: 35 % (ref 24–39)
T4 SERPL-MCNC: 8.3 UG/DL (ref 4.5–12)
TESTOST FREE SERPL-MCNC: 2.5 PG/ML (ref 0–4.2)
TESTOST SERPL-MCNC: 53 NG/DL (ref 4–50)
TSH SERPL DL<=0.005 MIU/L-ACNC: 1.95 UIU/ML (ref 0.45–4.5)
WBC # BLD AUTO: 6.4 X10E3/UL (ref 3.4–10.8)

## 2024-04-11 ENCOUNTER — TRANSCRIBE ORDERS (OUTPATIENT)
Dept: ADMINISTRATIVE | Facility: HOSPITAL | Age: 49
End: 2024-04-11
Payer: COMMERCIAL

## 2024-04-11 ENCOUNTER — HOSPITAL ENCOUNTER (OUTPATIENT)
Dept: NUTRITION | Facility: HOSPITAL | Age: 49
Discharge: HOME | End: 2024-04-11
Payer: COMMERCIAL

## 2024-04-11 DIAGNOSIS — E11.9 DIABETES MELLITUS WITH NO COMPLICATION: Primary | ICD-10-CM

## 2024-04-11 NOTE — PROGRESS NOTES
Adult Outpatient Nutrition  Assessment    Patient Name:  Tamara Stout  YOB: 1975  MRN: [id number]    Assessment Date:  4/11/2024    Comments:      Met w/ patient for nutrition follow-up regarding diabetes. Pt reports she has had a decreased appetite recently due to sickness with COVID. Pt also states that her recent cardiologist was concerned with her albumin being low and that she is not consuming enough protein. Recommended at previous visit that patient should consume 100-125g protein per day. Pt states she has been meal prepping and is no longer buying potato chips to keep at home.     Pt would like additional recommendations for higher protein food options, healthy recipes, and smoothie ideas. Pt w/ follow-up appointment scheduled for June 19th @ 10:30am. RD available PRN.     Electronically signed by:  Sarah Roberts RD  04/11/24 09:32 EDT

## (undated) DEVICE — SUT GUT CHRM 2/0 SH 27IN G123H

## (undated) DEVICE — GLV SURG BIOGEL M LTX PF 6 1/2

## (undated) DEVICE — SOL IRR H2O BTL 1000ML STRL

## (undated) DEVICE — RICH MINOR LITHOTOMY: Brand: MEDLINE INDUSTRIES, INC.

## (undated) DEVICE — SOL NACL 0.9PCT 1000ML

## (undated) DEVICE — ST IRR CYSTO W/SPK 77IN LF